# Patient Record
Sex: MALE | Race: ASIAN | NOT HISPANIC OR LATINO
[De-identification: names, ages, dates, MRNs, and addresses within clinical notes are randomized per-mention and may not be internally consistent; named-entity substitution may affect disease eponyms.]

---

## 2019-08-25 ENCOUNTER — TRANSCRIPTION ENCOUNTER (OUTPATIENT)
Age: 39
End: 2019-08-25

## 2020-05-05 ENCOUNTER — TRANSCRIPTION ENCOUNTER (OUTPATIENT)
Age: 40
End: 2020-05-05

## 2020-09-10 ENCOUNTER — APPOINTMENT (OUTPATIENT)
Dept: MRI IMAGING | Facility: CLINIC | Age: 40
End: 2020-09-10

## 2020-09-12 ENCOUNTER — OUTPATIENT (OUTPATIENT)
Dept: OUTPATIENT SERVICES | Facility: HOSPITAL | Age: 40
LOS: 1 days | End: 2020-09-12
Payer: COMMERCIAL

## 2020-09-12 ENCOUNTER — APPOINTMENT (OUTPATIENT)
Dept: MRI IMAGING | Facility: CLINIC | Age: 40
End: 2020-09-12
Payer: COMMERCIAL

## 2020-09-12 DIAGNOSIS — Z00.8 ENCOUNTER FOR OTHER GENERAL EXAMINATION: ICD-10-CM

## 2020-09-12 PROCEDURE — 72141 MRI NECK SPINE W/O DYE: CPT

## 2020-09-12 PROCEDURE — 72141 MRI NECK SPINE W/O DYE: CPT | Mod: 26

## 2021-04-19 ENCOUNTER — APPOINTMENT (OUTPATIENT)
Dept: DISASTER EMERGENCY | Facility: OTHER | Age: 41
End: 2021-04-19
Payer: COMMERCIAL

## 2021-04-19 PROCEDURE — 0001A: CPT

## 2021-05-12 ENCOUNTER — APPOINTMENT (OUTPATIENT)
Dept: DISASTER EMERGENCY | Facility: OTHER | Age: 41
End: 2021-05-12
Payer: COMMERCIAL

## 2021-05-12 PROCEDURE — 0002A: CPT

## 2021-09-14 ENCOUNTER — TRANSCRIPTION ENCOUNTER (OUTPATIENT)
Age: 41
End: 2021-09-14

## 2022-04-10 ENCOUNTER — TRANSCRIPTION ENCOUNTER (OUTPATIENT)
Age: 42
End: 2022-04-10

## 2022-07-26 ENCOUNTER — NON-APPOINTMENT (OUTPATIENT)
Age: 42
End: 2022-07-26

## 2022-08-17 ENCOUNTER — NON-APPOINTMENT (OUTPATIENT)
Age: 42
End: 2022-08-17

## 2022-11-08 ENCOUNTER — APPOINTMENT (OUTPATIENT)
Dept: ORTHOPEDIC SURGERY | Facility: CLINIC | Age: 42
End: 2022-11-08

## 2022-11-08 ENCOUNTER — NON-APPOINTMENT (OUTPATIENT)
Age: 42
End: 2022-11-08

## 2022-11-08 VITALS
SYSTOLIC BLOOD PRESSURE: 138 MMHG | BODY MASS INDEX: 30.61 KG/M2 | HEART RATE: 93 BPM | WEIGHT: 195 LBS | HEIGHT: 67 IN | DIASTOLIC BLOOD PRESSURE: 91 MMHG

## 2022-11-08 DIAGNOSIS — M25.532 PAIN IN LEFT WRIST: ICD-10-CM

## 2022-11-08 DIAGNOSIS — M77.12 LATERAL EPICONDYLITIS, LEFT ELBOW: ICD-10-CM

## 2022-11-08 PROCEDURE — 73110 X-RAY EXAM OF WRIST: CPT | Mod: LT

## 2022-11-08 PROCEDURE — 99204 OFFICE O/P NEW MOD 45 MIN: CPT

## 2022-11-08 RX ORDER — MELOXICAM 15 MG/1
15 TABLET ORAL
Qty: 30 | Refills: 11 | Status: ACTIVE | COMMUNITY
Start: 2022-11-08 | End: 1900-01-01

## 2023-01-10 ENCOUNTER — APPOINTMENT (OUTPATIENT)
Dept: ORTHOPEDIC SURGERY | Facility: CLINIC | Age: 43
End: 2023-01-10

## 2023-04-27 ENCOUNTER — NON-APPOINTMENT (OUTPATIENT)
Age: 43
End: 2023-04-27

## 2023-07-27 ENCOUNTER — NON-APPOINTMENT (OUTPATIENT)
Age: 43
End: 2023-07-27

## 2023-10-02 ENCOUNTER — APPOINTMENT (OUTPATIENT)
Dept: CARDIOLOGY | Facility: CLINIC | Age: 43
End: 2023-10-02

## 2024-05-26 ENCOUNTER — EMERGENCY (EMERGENCY)
Facility: HOSPITAL | Age: 44
LOS: 1 days | Discharge: HOME | End: 2024-05-26
Attending: EMERGENCY MEDICINE | Admitting: EMERGENCY MEDICINE
Payer: COMMERCIAL

## 2024-05-26 VITALS
OXYGEN SATURATION: 100 % | TEMPERATURE: 98 F | HEART RATE: 89 BPM | SYSTOLIC BLOOD PRESSURE: 121 MMHG | DIASTOLIC BLOOD PRESSURE: 82 MMHG | RESPIRATION RATE: 16 BRPM

## 2024-05-26 PROCEDURE — 99284 EMERGENCY DEPT VISIT MOD MDM: CPT

## 2024-05-26 RX ORDER — ACETAMINOPHEN 500 MG
975 TABLET ORAL ONCE
Refills: 0 | Status: COMPLETED | OUTPATIENT
Start: 2024-05-26 | End: 2024-05-26

## 2024-05-26 RX ORDER — DIAZEPAM 5 MG
5 TABLET ORAL ONCE
Refills: 0 | Status: DISCONTINUED | OUTPATIENT
Start: 2024-05-26 | End: 2024-05-26

## 2024-05-26 RX ORDER — LIDOCAINE 4 G/100G
1 CREAM TOPICAL ONCE
Refills: 0 | Status: COMPLETED | OUTPATIENT
Start: 2024-05-26 | End: 2024-05-26

## 2024-05-26 RX ORDER — IBUPROFEN 200 MG
400 TABLET ORAL ONCE
Refills: 0 | Status: COMPLETED | OUTPATIENT
Start: 2024-05-26 | End: 2024-05-26

## 2024-05-26 RX ADMIN — Medication 400 MILLIGRAM(S): at 12:23

## 2024-05-26 RX ADMIN — LIDOCAINE 1 PATCH: 4 CREAM TOPICAL at 12:23

## 2024-05-26 RX ADMIN — Medication 975 MILLIGRAM(S): at 12:25

## 2024-05-26 RX ADMIN — Medication 5 MILLIGRAM(S): at 12:23

## 2024-05-26 NOTE — ED ADULT NURSE NOTE - OBJECTIVE STATEMENT
pt alert, oriented x3. reports pain to back  radiating  to l leg. states pain increased yesterday after bending.  medicated as ordered. will continue to monitor.

## 2024-05-26 NOTE — ED ADULT TRIAGE NOTE - CHIEF COMPLAINT QUOTE
Pt A&OX4 c/o back pain and Left side sciatic pain has gotten worse in past 48 hours  No past medical Hx

## 2024-05-26 NOTE — ED PROVIDER NOTE - PHYSICAL EXAMINATION
Qamar Georges DO (PGY2)   Physical Exam:    Gen: NAD, AOx3  Head: NCAT  HEENT: EOMI, PEERLA  Lung: CTAB, no respiratory distress, no wheezes/rhonchi/rales B/L  CV: RRR, no murmurs, rubs or gallops  Abd: soft, NT, ND, no guarding, no rigidity, no rebound tenderness, no CVA tenderness   MSK: left lumbar paravertebral ttp, +straight leg test left side, no midline ttp to entire spine, no visible deformities, ROM normal in UE/LE.   Neuro: No focal sensory or motor deficits. Sensation intact to light touch all extremities.  Skin: Warm, well perfused, no rash, no leg swelling  Psych: normal affect, calm

## 2024-05-26 NOTE — ED PROVIDER NOTE - CHIEF COMPLAINT
Patient is now seeing pain management, getting injections. They will assume pain management from here as we have discussed previously. It's likely Dr Roldan will not continue tramadol as he focuses more on interventions.   The patient is a 43y Male complaining of back pain general.

## 2024-05-26 NOTE — ED PROVIDER NOTE - PROGRESS NOTE DETAILS
Resident: Qamar Georges, PGY2 – Pt was re-evaluated at bedside, VSS, feeling better overall. Results were discussed with patient as well as return precautions and follow up plan with PCP and/or specialist. Time was taken to answer any questions that the patient had before providing them with discharge paperwork. No risk factors or findings concerning for epidural abscess, diskitis, vertebral osteo, cord compression, cauda equina, vertebral fracture or jadyn malignancy, AAA, or pyelonephritis.  Patient instructed to consider further imaging and workup through their primary care physician as an outpatient, if symptoms persist. Will give spine center f/u

## 2024-05-26 NOTE — ED PROVIDER NOTE - OBJECTIVE STATEMENT
43-year-old male past medical history of sciatica presenting with left lumbar back pain.  Patient states pain exacerbated while he was leaning over fixing his printer yesterday.  Acutely worsening today.  Denies any bowel bladder incontinence, saddle anesthesia, fevers, chills, chest pain, shortness of breath, abdominal pain, nausea vomiting diarrhea.  Denies history of herniated disc.  States he took Motrin yesterday for minimal relief.  Accompanied by wife at bedside.

## 2024-05-26 NOTE — ED PROVIDER NOTE - ATTENDING CONTRIBUTION TO CARE
I performed a face-to-face evaluation of the patient and performed a history and physical examination. I agree with the history and physical examination. If this was a PA visit, I personally saw the patient with the PA and performed a substantive portion of the visit including all aspects of the medical decision making.    H/o L sciatica (didn't have MRI; got better w/ steroids and NSAIDs). Bent over recently. Exacerbated L sciatica. No neuro deficits or bowel/bladder incontinence. No cancer/fever/IVDA/trauma. Therefore, not likely epidural abscess, traumatic fracture, osteomyelitis, or spinal cord compression. Give pain meds and lido patch. Refer to [facility].

## 2024-05-26 NOTE — ED PROVIDER NOTE - PATIENT PORTAL LINK FT
You can access the FollowMyHealth Patient Portal offered by [facility] by registering at the following website: [email or web address]. By joining [facility]’s FollowMyHealth portal, you will also be able to view your health information using other applications (apps) compatible with our system.

## 2024-05-26 NOTE — ED PROVIDER NOTE - NSFOLLOWUPCLINICS_GEN_ALL_ED_FT
[facility]  Orthopedic Surgery  [address] [place] [address]  Phone: [phone]  Fax:     [facility]  Orthopedic Surgery  [address] [address]  Phone: [phone]  Fax:     [facility]  Orthopedic Surgery  [address] [place] [address]  Phone: [phone]  Fax:

## 2024-05-26 NOTE — ED PROVIDER NOTE - NSFOLLOWUPINSTRUCTIONS_ED_ALL_ED_FT
Please follow up with your primary care physician and a spine specialist if your symptoms persist - you will be given a referral     Back Pain    Back pain is very common in adults. The cause of back pain is rarely dangerous and the pain often gets better over time. The cause of your back pain may not be known and may include strain of muscles or ligaments, degeneration of the spinal disks, or arthritis. Occasionally the pain may radiate down your leg(s). Over-the-counter medicines to reduce pain and inflammation are often the most helpful. Stretching and remaining active frequently helps the healing process.     SEEK IMMEDIATE MEDICAL CARE IF YOU HAVE ANY OF THE FOLLOWING SYMPTOMS: bowel or bladder control problems, unusual weakness or numbness in your arms or legs, nausea or vomiting, abdominal pain, fever, dizziness/lightheadedness.    To control your pain at home, you should take Ibuprofen 400 mg along with Tylenol 650mg-1000mg every 6 to 8 hours. Limit your maximum daily Tylenol from all sources to 4000mg. Be aware that many other medications contain acetaminophen which is also known as Tylenol. Taking Tylenol and Ibuprofen together has been shown to be more effective at relieving pain than taking them separately. These are both over the counter medications that you can  at your local pharmacy without a prescription. You need to respect all of the warnings on the bottles. You shouldn’t take these medications for more than a week without following up with your doctor. Both medications come with certain risks and side effects that you need to discuss with your doctor, especially if you are taking them for a prolonged period.    Please follow up with your primary medical doctor within two days.  Return to the emergency department if you feel that your condition is worsening    You can take Tylenol or Advil for pain. Please follow the instructions on the bottles. Please follow up with your primary care physician and a spine specialist if your symptoms persist - you will be given a referral     Follow up with spine center ([phone]), and your primary Dr. for reevaluation.    Back Pain    Back pain is very common in adults. The cause of back pain is rarely dangerous and the pain often gets better over time. The cause of your back pain may not be known and may include strain of muscles or ligaments, degeneration of the spinal disks, or arthritis. Occasionally the pain may radiate down your leg(s). Over-the-counter medicines to reduce pain and inflammation are often the most helpful. Stretching and remaining active frequently helps the healing process.     SEEK IMMEDIATE MEDICAL CARE IF YOU HAVE ANY OF THE FOLLOWING SYMPTOMS: bowel or bladder control problems, unusual weakness or numbness in your arms or legs, nausea or vomiting, abdominal pain, fever, dizziness/lightheadedness.    To control your pain at home, you should take Ibuprofen 400 mg along with Tylenol 650mg-1000mg every 6 to 8 hours. Limit your maximum daily Tylenol from all sources to 4000mg. Be aware that many other medications contain acetaminophen which is also known as Tylenol. Taking Tylenol and Ibuprofen together has been shown to be more effective at relieving pain than taking them separately. These are both over the counter medications that you can  at your local pharmacy without a prescription. You need to respect all of the warnings on the bottles. You shouldn’t take these medications for more than a week without following up with your doctor. Both medications come with certain risks and side effects that you need to discuss with your doctor, especially if you are taking them for a prolonged period.    Please follow up with your primary medical doctor within two days.  Return to the emergency department if you feel that your condition is worsening    You can take Tylenol or Advil for pain. Please follow the instructions on the bottles.

## 2024-08-06 ENCOUNTER — APPOINTMENT (OUTPATIENT)
Dept: ORTHOPEDIC SURGERY | Facility: CLINIC | Age: 44
End: 2024-08-06

## 2024-08-06 PROBLEM — M79.641 PAIN OF RIGHT HAND: Status: ACTIVE | Noted: 2024-08-06

## 2024-08-06 PROBLEM — S69.91XA INJURY OF RIGHT HAND, INITIAL ENCOUNTER: Status: ACTIVE | Noted: 2024-08-06

## 2024-08-06 PROCEDURE — 99204 OFFICE O/P NEW MOD 45 MIN: CPT

## 2024-08-06 PROCEDURE — 73130 X-RAY EXAM OF HAND: CPT | Mod: RT

## 2024-08-06 NOTE — DISCUSSION/SUMMARY
[de-identified] : The patient sustained a sprain of the radial collateral ligament at the right index finger MCP joint.  I have discussed the pathology, natural history and treatment options with him.  He is treated by taping his index finger to his long finger.  Range of motion exercises are encouraged.  He will be reevaluated in 1 month.

## 2024-08-06 NOTE — HISTORY OF PRESENT ILLNESS
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 He reports trying to open a jammed window with significant force and hurt the right hand. He complains of constant aching pain in the right hand, most prominent at the 2nd MCP and radiates down the hand. He has tried ibuprofen with mild relief. Denies paresthesias. Denies prior injuries.

## 2024-08-06 NOTE — PHYSICAL EXAM
[Rad] : radial 2+ and symmetric bilaterally [Normal] : Alert and in no acute distress [Poor Appearance] : well-appearing [Acute Distress] : not in acute distress [Obese] : not obese [de-identified] : The patient has no respiratory distress. Mood and affect are normal. The patient is alert and oriented to person, place and time. There is no pain with motion of the shoulders or the elbows.  Examination of the wrist demonstrates no tenderness and no pain with motion.  On the right hand there is mild tenderness on the radial aspect of the index finger MCP joint.  There is an increase in pain with passive ulnar motion at the MCP joint.  There is no gross instability.  Tendon function is intact.  There is no triggering.  Tinel signs are negative.  The skin is intact.  There is no lymphedema. [de-identified] : AP, lateral and oblique x-rays of the right hand demonstrate no fracture, no dislocation and no bony abnormality.

## 2024-09-06 ENCOUNTER — APPOINTMENT (OUTPATIENT)
Dept: CARDIOLOGY | Facility: CLINIC | Age: 44
End: 2024-09-06

## 2024-09-06 ENCOUNTER — APPOINTMENT (OUTPATIENT)
Dept: ORTHOPEDIC SURGERY | Facility: CLINIC | Age: 44
End: 2024-09-06

## 2024-09-06 NOTE — REASON FOR VISIT
[Initial Evaluation] : an initial evaluation of [FreeTextEntry2] : vascular evaluation [FreeTextEntry1] : 9/6/2024  45 y/o M who presents for evaluation.

## 2024-09-15 ENCOUNTER — NON-APPOINTMENT (OUTPATIENT)
Age: 44
End: 2024-09-15

## 2025-03-11 ENCOUNTER — APPOINTMENT (OUTPATIENT)
Dept: ORTHOPEDIC SURGERY | Facility: CLINIC | Age: 45
End: 2025-03-11

## 2025-03-21 ENCOUNTER — APPOINTMENT (OUTPATIENT)
Dept: ORTHOPEDIC SURGERY | Facility: CLINIC | Age: 45
End: 2025-03-21

## 2025-03-21 ENCOUNTER — NON-APPOINTMENT (OUTPATIENT)
Age: 45
End: 2025-03-21

## 2025-03-21 VITALS — WEIGHT: 205 LBS | BODY MASS INDEX: 32.18 KG/M2 | HEIGHT: 67 IN

## 2025-03-21 DIAGNOSIS — M65.321 TRIGGER FINGER, RIGHT INDEX FINGER: ICD-10-CM

## 2025-03-21 PROCEDURE — 73130 X-RAY EXAM OF HAND: CPT | Mod: RT

## 2025-03-21 PROCEDURE — 99214 OFFICE O/P EST MOD 30 MIN: CPT | Mod: 25

## 2025-03-21 PROCEDURE — 20550 NJX 1 TENDON SHEATH/LIGAMENT: CPT | Mod: RT
